# Patient Record
Sex: FEMALE | Race: WHITE | Employment: OTHER | URBAN - METROPOLITAN AREA
[De-identification: names, ages, dates, MRNs, and addresses within clinical notes are randomized per-mention and may not be internally consistent; named-entity substitution may affect disease eponyms.]

---

## 2019-11-27 ENCOUNTER — HOSPITAL ENCOUNTER (EMERGENCY)
Facility: CLINIC | Age: 44
Discharge: HOME | End: 2019-11-27
Attending: PHYSICIAN ASSISTANT | Admitting: PHYSICIAN ASSISTANT
Payer: MEDICARE

## 2019-11-27 ENCOUNTER — APPOINTMENT (OUTPATIENT)
Dept: CT IMAGING | Facility: CLINIC | Age: 44
End: 2019-11-27
Attending: PHYSICIAN ASSISTANT
Payer: MEDICARE

## 2019-11-27 VITALS
SYSTOLIC BLOOD PRESSURE: 156 MMHG | WEIGHT: 232 LBS | RESPIRATION RATE: 16 BRPM | TEMPERATURE: 98 F | OXYGEN SATURATION: 99 % | DIASTOLIC BLOOD PRESSURE: 70 MMHG

## 2019-11-27 DIAGNOSIS — R30.0 DYSURIA: ICD-10-CM

## 2019-11-27 DIAGNOSIS — R10.2 PELVIC PAIN IN FEMALE: ICD-10-CM

## 2019-11-27 LAB
ALBUMIN SERPL-MCNC: 3.8 G/DL (ref 3.4–5)
ALBUMIN UR-MCNC: NEGATIVE MG/DL
ALP SERPL-CCNC: 130 U/L (ref 40–150)
ALT SERPL W P-5'-P-CCNC: 24 U/L (ref 0–50)
ANION GAP SERPL CALCULATED.3IONS-SCNC: 2 MMOL/L (ref 3–14)
APPEARANCE UR: CLEAR
AST SERPL W P-5'-P-CCNC: 25 U/L (ref 0–45)
BASOPHILS # BLD AUTO: 0 10E9/L (ref 0–0.2)
BASOPHILS NFR BLD AUTO: 0.8 %
BILIRUB SERPL-MCNC: 0.2 MG/DL (ref 0.2–1.3)
BILIRUB UR QL STRIP: NEGATIVE
BUN SERPL-MCNC: 24 MG/DL (ref 7–30)
CALCIUM SERPL-MCNC: 8.6 MG/DL (ref 8.5–10.1)
CHLORIDE SERPL-SCNC: 112 MMOL/L (ref 94–109)
CO2 SERPL-SCNC: 28 MMOL/L (ref 20–32)
COLOR UR AUTO: ABNORMAL
CREAT SERPL-MCNC: 0.64 MG/DL (ref 0.52–1.04)
DIFFERENTIAL METHOD BLD: ABNORMAL
EOSINOPHIL # BLD AUTO: 0.1 10E9/L (ref 0–0.7)
EOSINOPHIL NFR BLD AUTO: 2.5 %
ERYTHROCYTE [DISTWIDTH] IN BLOOD BY AUTOMATED COUNT: 25.1 % (ref 10–15)
GFR SERPL CREATININE-BSD FRML MDRD: >90 ML/MIN/{1.73_M2}
GLUCOSE SERPL-MCNC: 89 MG/DL (ref 70–99)
GLUCOSE UR STRIP-MCNC: NEGATIVE MG/DL
HCT VFR BLD AUTO: 35.3 % (ref 35–47)
HGB BLD-MCNC: 10 G/DL (ref 11.7–15.7)
HGB UR QL STRIP: NEGATIVE
IMM GRANULOCYTES # BLD: 0 10E9/L (ref 0–0.4)
IMM GRANULOCYTES NFR BLD: 0.2 %
INR PPP: 1.48 (ref 0.86–1.14)
KETONES UR STRIP-MCNC: NEGATIVE MG/DL
LEUKOCYTE ESTERASE UR QL STRIP: NEGATIVE
LYMPHOCYTES # BLD AUTO: 1.7 10E9/L (ref 0.8–5.3)
LYMPHOCYTES NFR BLD AUTO: 33.4 %
MCH RBC QN AUTO: 21.8 PG (ref 26.5–33)
MCHC RBC AUTO-ENTMCNC: 28.3 G/DL (ref 31.5–36.5)
MCV RBC AUTO: 77 FL (ref 78–100)
MONOCYTES # BLD AUTO: 0.4 10E9/L (ref 0–1.3)
MONOCYTES NFR BLD AUTO: 8.4 %
MUCOUS THREADS #/AREA URNS LPF: PRESENT /LPF
NEUTROPHILS # BLD AUTO: 2.8 10E9/L (ref 1.6–8.3)
NEUTROPHILS NFR BLD AUTO: 54.7 %
NITRATE UR QL: POSITIVE
NRBC # BLD AUTO: 0 10*3/UL
NRBC BLD AUTO-RTO: 0 /100
PH UR STRIP: 5 PH (ref 5–7)
PLATELET # BLD AUTO: 255 10E9/L (ref 150–450)
POTASSIUM SERPL-SCNC: 4.4 MMOL/L (ref 3.4–5.3)
PROT SERPL-MCNC: 8.2 G/DL (ref 6.8–8.8)
RBC # BLD AUTO: 4.58 10E12/L (ref 3.8–5.2)
RBC #/AREA URNS AUTO: 2 /HPF (ref 0–2)
SODIUM SERPL-SCNC: 142 MMOL/L (ref 133–144)
SOURCE: ABNORMAL
SP GR UR STRIP: 1.03 (ref 1–1.03)
SQUAMOUS #/AREA URNS AUTO: 1 /HPF (ref 0–1)
UROBILINOGEN UR STRIP-MCNC: 4 MG/DL (ref 0–2)
WBC # BLD AUTO: 5.1 10E9/L (ref 4–11)
WBC #/AREA URNS AUTO: 5 /HPF (ref 0–5)

## 2019-11-27 PROCEDURE — 96361 HYDRATE IV INFUSION ADD-ON: CPT | Performed by: PHYSICIAN ASSISTANT

## 2019-11-27 PROCEDURE — 87086 URINE CULTURE/COLONY COUNT: CPT | Performed by: PHYSICIAN ASSISTANT

## 2019-11-27 PROCEDURE — 80053 COMPREHEN METABOLIC PANEL: CPT | Performed by: FAMILY MEDICINE

## 2019-11-27 PROCEDURE — 74176 CT ABD & PELVIS W/O CONTRAST: CPT

## 2019-11-27 PROCEDURE — 81001 URINALYSIS AUTO W/SCOPE: CPT | Performed by: FAMILY MEDICINE

## 2019-11-27 PROCEDURE — 85610 PROTHROMBIN TIME: CPT | Performed by: PHYSICIAN ASSISTANT

## 2019-11-27 PROCEDURE — 25800030 ZZH RX IP 258 OP 636: Performed by: FAMILY MEDICINE

## 2019-11-27 PROCEDURE — 25000132 ZZH RX MED GY IP 250 OP 250 PS 637: Mod: GY | Performed by: PHYSICIAN ASSISTANT

## 2019-11-27 PROCEDURE — 99284 EMERGENCY DEPT VISIT MOD MDM: CPT | Mod: 25 | Performed by: PHYSICIAN ASSISTANT

## 2019-11-27 PROCEDURE — 96360 HYDRATION IV INFUSION INIT: CPT | Performed by: PHYSICIAN ASSISTANT

## 2019-11-27 PROCEDURE — 99284 EMERGENCY DEPT VISIT MOD MDM: CPT | Mod: Z6 | Performed by: PHYSICIAN ASSISTANT

## 2019-11-27 PROCEDURE — 85025 COMPLETE CBC W/AUTO DIFF WBC: CPT | Performed by: FAMILY MEDICINE

## 2019-11-27 RX ORDER — ACETAMINOPHEN 500 MG
1000 TABLET ORAL ONCE
Status: COMPLETED | OUTPATIENT
Start: 2019-11-27 | End: 2019-11-27

## 2019-11-27 RX ADMIN — SODIUM CHLORIDE, POTASSIUM CHLORIDE, SODIUM LACTATE AND CALCIUM CHLORIDE 1000 ML: 600; 310; 30; 20 INJECTION, SOLUTION INTRAVENOUS at 14:06

## 2019-11-27 RX ADMIN — ACETAMINOPHEN 1000 MG: 500 TABLET, FILM COATED ORAL at 16:44

## 2019-11-27 NOTE — ED AVS SNAPSHOT
[facility] Emergency Department  [address] [facility] [address]  Phone:  [phone]  Fax:  [phone]                                    Karen Balbuena   MRN: [id number]    Department:  [facility] Emergency Department   Date of Visit:  11/27/2019           After Visit Summary Signature Page    I have received my discharge instructions, and my questions have been answered. I have discussed any challenges I see with this plan with the nurse or doctor.    ..........................................................................................................................................  Patient/Patient Representative Signature      ..........................................................................................................................................  Patient Representative Print Name and Relationship to Patient    ..................................................               ................................................  Date                                   Time    ..........................................................................................................................................  Reviewed by Signature/Title    ...................................................              ..............................................  Date                                               Time          22EPIC Rev 08/18

## 2019-11-27 NOTE — ED PROVIDER NOTES
History     Chief Complaint   Patient presents with     Abdominal Pain     right flank pain nausea no emesis no diarrhea being treated for UTI having burning with urination from [facility]  Karen Balbuena is a 44 year old female who is currently n inpatient treatment at [facility] for alcohol and opioid use who presents and has been being treated for the last week for urinary tract infection initially with 5 days of amoxicillin however was later switched to Macrobid within the last two days.  She complains of dysuria,  Increased urgency frequency, burning, vaginal burning.  She also complains of subjective fever, chills, allergies, nausea and sweats last night which have somewhat improved.  She denies any objective temp measured, sore throat, cough, chest pain, dyspnea, wheezing, vomiting, diarrhea, melena, hematochezia, vaginal discharge.  In addition to antibiotics she has been taking Tylenol, Pyridium for pain control with inadequate results.  Her past surgical history is significant for history of gastric bypass with cholecystectomy, hysterectomy, single oophorectomy.       Allergies:  Allergies   Allergen Reactions     Codeine      Nsaids      Sulfa Drugs      Sulfate      Problem List:    There are no active problems to display for this patient.     Past Medical History:    No past medical history on file.    Past Surgical History:    No past surgical history on file.    Family History:    No family history on file.    Social History:  Marital Status:    Social History     Tobacco Use     Smoking status: Not on file   Substance Use Topics     Alcohol use: Not on file     Drug use: Not on file      Medications:    No current outpatient medications on file.    Review of Systems   Constitutional: Positive for activity change, chills and fever (subjective). Negative for appetite change and diaphoresis.   Eyes: Negative for photophobia, pain, discharge, redness and itching.   Respiratory: Negative for  cough, shortness of breath and wheezing.    Cardiovascular: Negative for chest pain, palpitations and leg swelling.   Gastrointestinal: Positive for abdominal pain and nausea. Negative for diarrhea and vomiting.   Genitourinary: Positive for dysuria, flank pain, frequency, pelvic pain and urgency. Negative for dyspareunia, genital sores, hematuria, vaginal bleeding, vaginal discharge and vaginal pain.   Musculoskeletal: Negative for arthralgias, back pain, myalgias and neck pain.   Skin: Negative for color change, rash and wound.   Neurological: Negative for dizziness, weakness, light-headedness, numbness and headaches.   All other systems reviewed and are negative.    Physical Exam   BP: (!) 156/70  Heart Rate: 94  Temp: 98  F (36.7  C)  Resp: 16  Weight: 105.2 kg (232 lb)  SpO2: 99 %  Physical Exam  GENERAL APPEARANCE:alert and no distress  EYES: EOMI,  PERRL, conjunctiva clear  HENT: ear canals and TM's normal.  Nose and mouth without ulcers, erythema or lesions  NECK: supple, nontender, no lymphadenopathy  RESP: lungs clear to auscultation - no rales, rhonchi or wheezes  CV: regular rates and rhythm, normal S1 S2, no murmur noted  ABDOMEN:  Soft, multifocal intermittent pelvic tenderness to palpation, does resolve with distraction, no guarding, rebound, no HSM or masses and bowel sounds normal  SKIN: no suspicious lesions or rashes  ED Course        Procedures        Critical Care time:  none        Results for orders placed or performed during the hospital encounter of 11/27/19 (from the past 24 hour(s))   UA with Microscopic   Result Value Ref Range    Color Urine [place]     Appearance Urine Clear     Glucose Urine Negative NEG^Negative mg/dL    Bilirubin Urine Negative NEG^Negative    Ketones Urine Negative NEG^Negative mg/dL    Specific Gravity Urine 1.028 1.003 - 1.035    Blood Urine Negative NEG^Negative    pH Urine 5.0 5.0 - 7.0 pH    Protein Albumin Urine Negative NEG^Negative mg/dL    Urobilinogen mg/dL  4.0 (H) 0.0 - 2.0 mg/dL    Nitrite Urine Positive (A) NEG^Negative    Leukocyte Esterase Urine Negative NEG^Negative    Source Midstream Urine     WBC Urine 5 0 - 5 /HPF    RBC Urine 2 0 - 2 /HPF    Squamous Epithelial /HPF Urine 1 0 - 1 /HPF    Mucous Urine Present (A) NEG^Negative /LPF   CBC with platelets, differential   Result Value Ref Range    WBC 5.1 4.0 - 11.0 10e9/L    RBC Count 4.58 3.8 - 5.2 10e12/L    Hemoglobin 10.0 (L) 11.7 - 15.7 g/dL    Hematocrit 35.3 35.0 - 47.0 %    MCV 77 (L) 78 - 100 fl    MCH 21.8 (L) 26.5 - 33.0 pg    MCHC 28.3 (L) 31.5 - 36.5 g/dL    RDW 25.1 (H) 10.0 - 15.0 %    Platelet Count 255 150 - 450 10e9/L    Diff Method Automated Method     % Neutrophils 54.7 %    % Lymphocytes 33.4 %    % Monocytes 8.4 %    % Eosinophils 2.5 %    % Basophils 0.8 %    % Immature Granulocytes 0.2 %    Nucleated RBCs 0 0 /100    Absolute Neutrophil 2.8 1.6 - 8.3 10e9/L    Absolute Lymphocytes 1.7 0.8 - 5.3 10e9/L    Absolute Monocytes 0.4 0.0 - 1.3 10e9/L    Absolute Eosinophils 0.1 0.0 - 0.7 10e9/L    Absolute Basophils 0.0 0.0 - 0.2 10e9/L    Abs Immature Granulocytes 0.0 0 - 0.4 10e9/L    Absolute Nucleated RBC 0.0    Comprehensive metabolic panel   Result Value Ref Range    Sodium 142 133 - 144 mmol/L    Potassium 4.4 3.4 - 5.3 mmol/L    Chloride 112 (H) 94 - 109 mmol/L    Carbon Dioxide 28 20 - 32 mmol/L    Anion Gap 2 (L) 3 - 14 mmol/L    Glucose 89 70 - 99 mg/dL    Urea Nitrogen 24 7 - 30 mg/dL    Creatinine 0.64 0.52 - 1.04 mg/dL    GFR Estimate >90 >60 mL/min/[1.73_m2]    GFR Estimate If Black >90 >60 mL/min/[1.73_m2]    Calcium 8.6 8.5 - 10.1 mg/dL    Bilirubin Total 0.2 0.2 - 1.3 mg/dL    Albumin 3.8 3.4 - 5.0 g/dL    Protein Total 8.2 6.8 - 8.8 g/dL    Alkaline Phosphatase 130 40 - 150 U/L    ALT 24 0 - 50 U/L    AST 25 0 - 45 U/L   INR   Result Value Ref Range    INR 1.48 (H) 0.86 - 1.14   Abd/pelvis CT - no contrast - Stone Protocol    Narrative    CT ABDOMEN AND PELVIS WITHOUT CONTRAST    11/27/2019 3:25 PM     HISTORY: Right-sided flank pain, dysuria, urinary frequency, rule out  stone.    TECHNIQUE:   No IV contrast material. Radiation dose for this scan was  reduced using automated exposure control, adjustment of the mA and/or  kV according to patient size, or iterative reconstruction technique.    COMPARISON: None.    FINDINGS:    Visualized lung bases are unremarkable. The liver has an unremarkable  appearance. Previous cholecystectomy. Spleen is normal. Pancreas is  normal. No adrenal lesions.    No kidney stones. Right upper pole collecting system is mildly more  prominent than the left but likely still within normal limits. Bladder  is unremarkable. No pelvic adenopathy.    Large amount of stool in the rectum. No diverticulitis. The appendix  is normal. Evidence of previous gastric bypass surgery without  evidence of any dilatation. No dilated bowel, free air, or free fluid.  Mild to moderate degenerative changes in the lower lumbar spine.      Impression    IMPRESSION:  1. No evidence of kidney stones. Right upper pole collecting system  appears mildly prominent when compared to the left. This is likely  within normal limits however. No definite hydronephrosis or  hydroureter.  2. Degenerative changes lower lumbar spine.  3. Otherwise unremarkable.    DARIA EDWARD MD     Medications   lactated ringers BOLUS 1,000 mL (1,000 mLs Intravenous New Bag 11/27/19 1406)     Assessments & Plan (with Medical Decision Making)     I have reviewed the nursing notes.    I have reviewed the findings, diagnosis, plan and need for follow up with the patient.       There are no discharge medications for this patient.    Final diagnoses:   Pelvic pain in female   Dysuria     44-year-old female who has recently been on 2 different antibiotics for suspected urinary tract infection presents to the emergency department with concerns over persistent dysuria, increased urinary frequency, urgency and pelvic/right  flank pain.  She was noted to be hypertensive upon arrival, remainder of vital signs within normal limits.  Physical exam findings as described above were significant for intermittent diffuse tenderness to palpation of the pelvic region with did improve with distraction and did not have any rebound or guarding.  However patient refused pelvic examination.  Laboratory evaluation did show urinalysis which was positive for nitrites only without other evidence of infection, suspect this is a false positive due to Pyridium use will culture urine to confirm absence of infection.  Additional laboratory evaluation included CBC which showed anemia which is consistent with patient's baseline per her report, non-concerning BMP and INR which was subtherapeutic for her history of PE/DVT.  She did have CT of her abdomen and pelvis with possibility of stone which was negative for nephrolithiasis, hydronephrosis, finding of degenerative changes of the lower lumbar spine.  Differential for patient's symptoms would include chemical urethritis.  I have low suspicion for sexually transmitted infection causing urethritis however did discuss risk/benefits of obtaining examination and testing and patient declined. I do not suspect ovarian torsion.  Given timing, I do not suspect alcohol or opioid withdrawal. .  Exact etiology of the pain is unclear at this time, will consider possibility of her fibromyalgia.  She was discharged back to [facility] with instructions for continued symptomatic treatment as needed with tylenol. Follow up if no improvement in 3-5 days.  Worrisome reasons to return to ER/UC sooner discussed.     Disclaimer: This note consists of symbols derived from keyboarding, dictation, and/or voice recognition software. As a result, there may be errors in the script that have gone undetected.  Please consider this when interpreting information found in the chart.          11/27/2019   [facility] EMERGENCY DEPARTMENT      Mariana Kuo, PA-C  11/30/19 111

## 2019-11-27 NOTE — ED NOTES
Lower abdominal pain, right-sided flank pain, pain with urination, and nausea for 3 days.  Patient is currently on antibiotics for UTI.

## 2019-11-28 LAB
BACTERIA SPEC CULT: NO GROWTH
Lab: NORMAL
SPECIMEN SOURCE: NORMAL

## 2019-11-29 NOTE — RESULT ENCOUNTER NOTE
Final urine culture report is NEGATIVE per [facility] ED [facility] Result protocol.    If NEGATIVE result, no change in treatment, per [facility] ED [facility] Result protocol.

## 2019-11-30 ASSESSMENT — ENCOUNTER SYMPTOMS
ABDOMINAL PAIN: 1
WOUND: 0
FREQUENCY: 1
EYE DISCHARGE: 0
PALPITATIONS: 0
DYSURIA: 1
DIZZINESS: 0
HEMATURIA: 0
BACK PAIN: 0
DIAPHORESIS: 0
WEAKNESS: 0
DIARRHEA: 0
FLANK PAIN: 1
VOMITING: 0
MYALGIAS: 0
PHOTOPHOBIA: 0
COUGH: 0
LIGHT-HEADEDNESS: 0
SHORTNESS OF BREATH: 0
EYE PAIN: 0
EYE REDNESS: 0
NUMBNESS: 0
NECK PAIN: 0
EYE ITCHING: 0
APPETITE CHANGE: 0
NAUSEA: 1
HEADACHES: 0
COLOR CHANGE: 0
WHEEZING: 0
FEVER: 1
ACTIVITY CHANGE: 1
CHILLS: 1
ARTHRALGIAS: 0

## 2019-12-07 ENCOUNTER — APPOINTMENT (OUTPATIENT)
Dept: GENERAL RADIOLOGY | Facility: CLINIC | Age: 44
End: 2019-12-07
Attending: PHYSICIAN ASSISTANT
Payer: MEDICARE

## 2019-12-07 ENCOUNTER — HOSPITAL ENCOUNTER (EMERGENCY)
Facility: CLINIC | Age: 44
Discharge: HOME | End: 2019-12-07
Attending: PHYSICIAN ASSISTANT | Admitting: PHYSICIAN ASSISTANT
Payer: MEDICARE

## 2019-12-07 VITALS
BODY MASS INDEX: 36.41 KG/M2 | OXYGEN SATURATION: 98 % | TEMPERATURE: 98.1 F | WEIGHT: 232 LBS | RESPIRATION RATE: 20 BRPM | SYSTOLIC BLOOD PRESSURE: 130 MMHG | HEIGHT: 67 IN | HEART RATE: 77 BPM | DIASTOLIC BLOOD PRESSURE: 86 MMHG

## 2019-12-07 DIAGNOSIS — S99.921A INJURY OF RIGHT FOOT, INITIAL ENCOUNTER: ICD-10-CM

## 2019-12-07 PROCEDURE — 99213 OFFICE O/P EST LOW 20 MIN: CPT | Mod: Z6 | Performed by: PHYSICIAN ASSISTANT

## 2019-12-07 PROCEDURE — G0463 HOSPITAL OUTPT CLINIC VISIT: HCPCS | Performed by: PHYSICIAN ASSISTANT

## 2019-12-07 PROCEDURE — 73630 X-RAY EXAM OF FOOT: CPT | Mod: RT

## 2019-12-07 ASSESSMENT — MIFFLIN-ST. JEOR: SCORE: 1739.98

## 2019-12-07 ASSESSMENT — ENCOUNTER SYMPTOMS
COLOR CHANGE: 0
WOUND: 0
FEVER: 0

## 2019-12-07 NOTE — ED PROVIDER NOTES
History     Chief Complaint   Patient presents with     Foot Pain     was stepped on with a high heeled boot 5 days ago.      HPI    Karen Balbuena is a 44 year old female who sustained a right foot injury 5 days ago.   Mechanism of injury: patient states another person stepped on her right foot with a high heel boot.   Immediate symptoms: immediate pain, delayed swelling, was able to bear weight directly after injury, no deformity was noted by the patient, positive persistent pain.   Symptoms have been sudden since that time.   Prior history of related problems: no prior problems with this area in the past.  Patient states she has been icing, elevating, ace wrap, and tylenol over the counter with no improvement.     Patient denies numbness, ankle pain, redness, warmth, or abrasion.     Problem list, Medication list, Allergies, and Medical/Social/Surgical histories reviewed in EPIC and updated as appropriate.      Allergies:  Allergies   Allergen Reactions     Codeine      Morphine Hives     Nsaids      Sulfa Drugs      Sulfate        Problem List:    There are no active problems to display for this patient.       Past Medical History:    Past Medical History:   Diagnosis Date     H/O lupus anticoagulant disorder      RA (rheumatoid arthritis) (H)        Past Surgical History:    Past Surgical History:   Procedure Laterality Date     GI SURGERY       GYN SURGERY       ORTHOPEDIC SURGERY         Family History:    No family history on file.    Social History:  Marital Status:   [2]  Social History     Tobacco Use     Smoking status: Never Smoker     Smokeless tobacco: Never Used   Substance Use Topics     Alcohol use: Not on file     Drug use: Not on file        Medications:    No current outpatient medications on file.        Review of Systems   Constitutional: Negative for fever.   Musculoskeletal:        Right foot pain over the 1st MTP joint.    Skin: Negative for color change, pallor, rash and  "wound.   All other systems reviewed and are negative.      Physical Exam   BP: 130/86  Pulse: 77  Temp: 98.1  F (36.7  C)  Resp: 20  Height: 170.2 cm (5' 7\")  Weight: 105.2 kg (232 lb)  SpO2: 98 %      Physical Exam  Vitals signs and nursing note reviewed.   Constitutional:       Appearance: Normal appearance.   Cardiovascular:      Pulses: Normal pulses.   Musculoskeletal:      Right foot: Decreased range of motion. Normal capillary refill. Tenderness and bony tenderness present. No swelling, crepitus, deformity or laceration.        Feet:    Neurological:      General: No focal deficit present.      Mental Status: She is alert and oriented to person, place, and time.      GCS: GCS eye subscore is 4. GCS verbal subscore is 5. GCS motor subscore is 6.      Sensory: Sensation is intact.      Motor: Motor function is intact.      Gait: Gait abnormal (limited due to pain. ).   Psychiatric:         Mood and Affect: Mood normal.         Behavior: Behavior normal.         Thought Content: Thought content normal.         Judgment: Judgment normal.         ED Course        Procedures              Critical Care time:  none               Results for orders placed or performed during the hospital encounter of 12/07/19 (from the past 24 hour(s))   Foot  XR, G/E 3 views, right    Narrative    XR FOOT RT G/E 3 VW 12/7/2019 2:51 PM     HISTORY: patient injured right foot when a high heel boot stepped on  her right foot over the 1st MTP joint; pos pain to right foot. Rule  out fracture.    COMPARISON: None.    FINDINGS: No acute fracture or dislocation. Mild degenerative changes.  Chronically bipartite tibial sesamoid. Moderate calcaneal heel  spur/enthesophytes. No erosions or osseous lesion.      Impression    IMPRESSION: No acute osseous abnormality demonstrated.     GISELLE KRAUS MD       Medications - No data to display    Assessments & Plan (with Medical Decision Making)     I have reviewed the nursing notes.    I have " reviewed the findings, diagnosis, plan and need for follow up with the patient.   43-year-old female presents the urgent care with right foot injury that occurred 5 days ago.  Patient with persistent pain.  Exam findings above.  X-ray obtained in office today with no acute osseous abnormality demonstrated.  Patient given postop shoe and informed to continue symptomatic treatment with Tylenol, ice, rest, elevation, and following up with primary care doctor for recheck in 1 to 2 weeks if symptoms persist or fail to improve.  No concerns at this time for cellulitis or gout.  Patient informed to return to the emergency department symptoms worsen or change these were discussed with patient on discharge paperwork.  Patient discharged in stable condition.    New Prescriptions    No medications on file       Final diagnoses:   Injury of right foot, initial encounter       12/7/2019   [facility] EMERGENCY DEPARTMENT     Milena Alves PA-C  12/07/19 1531

## 2019-12-07 NOTE — ED AVS SNAPSHOT
[facility] Emergency Department  [address] [facility] [address]  Phone:  [phone]  Fax:  [phone]                                    Karen Balbuena   MRN: [id number]    Department:  [facility] Emergency Department   Date of Visit:  12/7/2019           After Visit Summary Signature Page    I have received my discharge instructions, and my questions have been answered. I have discussed any challenges I see with this plan with the nurse or doctor.    ..........................................................................................................................................  Patient/Patient Representative Signature      ..........................................................................................................................................  Patient Representative Print Name and Relationship to Patient    ..................................................               ................................................  Date                                   Time    ..........................................................................................................................................  Reviewed by Signature/Title    ...................................................              ..............................................  Date                                               Time          22EPIC Rev 08/18

## 2019-12-07 NOTE — DISCHARGE INSTRUCTIONS
Ice, elevate, rest, tylenol over the counter for pain.     Compression as needed.     Post op shoe as needed for walking.     Return if redness, warmth, swelling, red streaking up foot, fevers, or numbness occur.     Follow up with primary care provider for recheck in 1-2 weeks.